# Patient Record
Sex: MALE | Race: WHITE | NOT HISPANIC OR LATINO | URBAN - METROPOLITAN AREA
[De-identification: names, ages, dates, MRNs, and addresses within clinical notes are randomized per-mention and may not be internally consistent; named-entity substitution may affect disease eponyms.]

---

## 2023-04-28 ENCOUNTER — APPOINTMENT (RX ONLY)
Dept: URBAN - METROPOLITAN AREA CLINIC 5 | Facility: CLINIC | Age: 53
Setting detail: DERMATOLOGY
End: 2023-04-28

## 2023-04-28 DIAGNOSIS — D492 NEOPLASM OF UNSPECIFIED NATURE OF BONE, SOFT TISSUE, AND SKIN: ICD-10-CM

## 2023-04-28 DIAGNOSIS — D17 BENIGN LIPOMATOUS NEOPLASM: ICD-10-CM

## 2023-04-28 DIAGNOSIS — Z71.89 OTHER SPECIFIED COUNSELING: ICD-10-CM

## 2023-04-28 PROBLEM — D17.1 BENIGN LIPOMATOUS NEOPLASM OF SKIN AND SUBCUTANEOUS TISSUE OF TRUNK: Status: ACTIVE | Noted: 2023-04-28

## 2023-04-28 PROBLEM — D17.0 BENIGN LIPOMATOUS NEOPLASM OF SKIN AND SUBCUTANEOUS TISSUE OF HEAD, FACE AND NECK: Status: ACTIVE | Noted: 2023-04-28

## 2023-04-28 PROBLEM — R22.9 LOCALIZED SWELLING, MASS AND LUMP, UNSPECIFIED: Status: ACTIVE | Noted: 2023-04-28

## 2023-04-28 PROBLEM — R22.1 LOCALIZED SWELLING, MASS AND LUMP, NECK: Status: ACTIVE | Noted: 2023-04-28

## 2023-04-28 PROCEDURE — ? ORDER ULTRASOUND

## 2023-04-28 PROCEDURE — ? COUNSELING

## 2023-04-28 PROCEDURE — 99203 OFFICE O/P NEW LOW 30 MIN: CPT

## 2023-04-28 ASSESSMENT — LOCATION ZONE DERM
LOCATION ZONE: NECK
LOCATION ZONE: FACE
LOCATION ZONE: NECK
LOCATION ZONE: TRUNK

## 2023-04-28 ASSESSMENT — LOCATION DETAILED DESCRIPTION DERM
LOCATION DETAILED: RIGHT MEDIAL TRAPEZIAL NECK
LOCATION DETAILED: RIGHT LATERAL MANDIBULAR CHEEK
LOCATION DETAILED: RIGHT SUPERIOR LATERAL NECK
LOCATION DETAILED: LEFT SUPERIOR UPPER BACK

## 2023-04-28 ASSESSMENT — LOCATION SIMPLE DESCRIPTION DERM
LOCATION SIMPLE: LEFT UPPER BACK
LOCATION SIMPLE: POSTERIOR NECK
LOCATION SIMPLE: NECK
LOCATION SIMPLE: RIGHT CHEEK

## 2023-04-28 NOTE — PROCEDURE: ORDER ULTRASOUND
Other Ultrasound Protocol Name: soft tissue
Lesion Location: jawline
Ultrasound Protocol: Ultrasound of Subcutaneous Mass
Provider: Florentin Pichardo PA-C
Priority: normal
Subcutaneous Lesion Ultrasound Reason: Diagnose soft tissue mass
Detail Level: Simple